# Patient Record
Sex: FEMALE | Race: WHITE | NOT HISPANIC OR LATINO | Employment: FULL TIME | URBAN - METROPOLITAN AREA
[De-identification: names, ages, dates, MRNs, and addresses within clinical notes are randomized per-mention and may not be internally consistent; named-entity substitution may affect disease eponyms.]

---

## 2017-12-26 ENCOUNTER — OFFICE VISIT (OUTPATIENT)
Dept: URGENT CARE | Facility: CLINIC | Age: 52
End: 2017-12-26
Payer: COMMERCIAL

## 2017-12-26 VITALS
BODY MASS INDEX: 27.31 KG/M2 | SYSTOLIC BLOOD PRESSURE: 147 MMHG | HEIGHT: 64 IN | DIASTOLIC BLOOD PRESSURE: 86 MMHG | RESPIRATION RATE: 18 BRPM | TEMPERATURE: 101 F | WEIGHT: 160 LBS | HEART RATE: 114 BPM | OXYGEN SATURATION: 97 %

## 2017-12-26 DIAGNOSIS — J10.1 INFLUENZA A: Primary | ICD-10-CM

## 2017-12-26 LAB
CTP QC/QA: YES
FLUAV AG NPH QL: POSITIVE
FLUBV AG NPH QL: NEGATIVE

## 2017-12-26 PROCEDURE — 87804 INFLUENZA ASSAY W/OPTIC: CPT | Mod: QW,S$GLB,, | Performed by: EMERGENCY MEDICINE

## 2017-12-26 PROCEDURE — 99214 OFFICE O/P EST MOD 30 MIN: CPT | Mod: 25,S$GLB,, | Performed by: EMERGENCY MEDICINE

## 2017-12-26 RX ORDER — PROMETHAZINE HYDROCHLORIDE AND CODEINE PHOSPHATE 6.25; 1 MG/5ML; MG/5ML
5 SOLUTION ORAL EVERY 4 HOURS PRN
Qty: 120 ML | Refills: 0 | Status: SHIPPED | OUTPATIENT
Start: 2017-12-26 | End: 2018-01-05

## 2017-12-26 RX ORDER — PREDNISONE 20 MG/1
20 TABLET ORAL DAILY
Qty: 3 TABLET | Refills: 0 | Status: SHIPPED | OUTPATIENT
Start: 2017-12-26 | End: 2017-12-29

## 2017-12-26 RX ORDER — OSELTAMIVIR PHOSPHATE 75 MG/1
75 CAPSULE ORAL 2 TIMES DAILY
Qty: 10 CAPSULE | Refills: 0 | Status: SHIPPED | OUTPATIENT
Start: 2017-12-26 | End: 2017-12-31

## 2017-12-26 NOTE — LETTER
December 26, 2017      [facility] [address] [place]  [facility] [address]  Phone: [phone]  Fax: [phone]       Patient: Madhavi Issa   YOB: 1965  Date of Visit: 12/26/2017    To Whom It May Concern:    Murali Issa  was at [facility] on 12/26/2017. She may return to work/school on 12/29/2017 with no restrictions. If you have any questions or concerns, or if I can be of further assistance, please do not hesitate to contact me.    Sincerely,      Navid Davies III, MD

## 2017-12-27 NOTE — PATIENT INSTRUCTIONS
Please drink plenty of fluids.  Please get plenty of rest.  Please return here or go to the Emergency Department for any concerns or worsening of condition.  If you were prescribed antibiotics, please take them to completion.  If you were given wait & see antibiotics, please wait 5-7 days before taking them, and only take them if your symptoms have worsened or not improved.  If you do begin taking the antibiotics, please take them to completion.  If you were prescribed a narcotic medication, do not drive or operate heavy equipment or machinery while taking these medications.  If you were given a steroid shot in the clinic and have also been given a prescription for a steroid such as Prednisone or a Medrol Dose Pack, please begin taking them tomorrow.  If you do not have Hypertension or any history of palpitations, it is ok to take over the counter Sudafed or Mucinex D or Allegra-D or Claritin-D or Zyrtec-D.  If you do take one of the above, it is ok to combine that with plain over the counter Mucinex or Allegra or Claritin or Zyrtec.  If for example you are taking Zyrtec -D, you can combine that with Mucinex, but not Mucinex-D.  If you are taking Mucinex-D, you can combine that with plain Allegra or Claritin or Zyrtec.   If you do have Hypertension or palpitations, it is safe to take Coricidin HBP for relief of sinus symptoms.  If not allergic, please take over the counter Tylenol (Acetaminophen) and/or Motrin (Ibuprofen) as directed for control of pain and/or fever.  Please follow up with your primary care doctor or specialist as needed.    If you  smoke, please stop smoking.      Influenza (Adult)    Influenza is also called the flu. It is a viral illness that affects the air passages of your lungs. It is different from the common cold. The flu can easily be passed from one to person to another. It may be spread through the air by coughing and sneezing. Or it can be spread by touching the sick person and then  touching your own eyes, nose, or mouth.  The flu starts 1 to 3 days after you are exposed to the flu virus. It may last for 1 to 2 weeks but many people feel tired or fatigued for many weeks afterward. You usually dont need to take antibiotics unless you have a complication. This might be an ear or sinus infection or pneumonia.  Symptoms of the flu may be mild or severe. They can include extreme tiredness (wanting to stay in bed all day), chills, fevers, muscle aches, soreness with eye movement, headache, and a dry, hacking cough.  Home care  Follow these guidelines when caring for yourself at home:  · Avoid being around cigarette smoke, whether yours or other peoples.  · Acetaminophen or ibuprofen will help ease your fever, muscle aches, and headache. Dont give aspirin to anyone younger than 18 who has the flu. Aspirin can harm the liver.  · Nausea and loss of appetite are common with the flu. Eat light meals. Drink 6 to 8 glasses of liquids every day. Good choices are water, sport drinks, soft drinks without caffeine, juices, tea, and soup. Extra fluids will also help loosen secretions in your nose and lungs.  · Over-the-counter cold medicines will not make the flu go away faster. But the medicines may help with coughing, sore throat, and congestion in your nose and sinuses. Dont use a decongestant if you have high blood pressure.  · Stay home until your fever has been gone for at least 24 hours without using medicine to reduce fever.  Follow-up care  Follow up with your healthcare provider, or as advised, if you are not getting better over the next week.  If you are age 65 or older, talk with your provider about getting a pneumococcal vaccine every 5 years. You should also get this vaccine if you have chronic asthma or COPD. All adults should get a flu vaccine every fall. Ask your provider about this.  When to seek medical advice  Call your healthcare provider right away if any of these occur:  · Cough with  lots of colored mucus (sputum) or blood in your mucus  · Chest pain, shortness of breath, wheezing, or trouble breathing  · Severe headache, or face, neck, or ear pain  · New rash with fever  · Fever of 100.4°F (38°C) or higher, or as directed by your healthcare provider  · Confusion, behavior change, or seizure  · Severe weakness or dizziness  · You get a new fever or cough after getting better for a few days  Date Last Reviewed: 1/1/2017  © 4460-4334 [employer]. [address] [place], [place] [id number]. All rights reserved. This information is not intended as a substitute for professional medical care. Always follow your healthcare professional's instructions.

## 2017-12-27 NOTE — PROGRESS NOTES
Subjective:       Patient ID: Madhavi Issa is a 52 y.o. female.    Vitals:    12/26/17 1916   BP: (!) 147/86   Pulse: (!) 114   Resp: 18   Temp: (!) 101.1 °F (38.4 °C)       Chief Complaint: Cough    Pt reports that she woke up yesterday morning with chills and body aches. Pt also complaints of congestion and cough. Pt has been taking Advil and Claritin d for symptoms.       Cough   This is a new problem. The current episode started yesterday. The problem has been unchanged. The cough is productive of sputum. Associated symptoms include chills, myalgias and a sore throat. Pertinent negatives include no chest pain, ear pain, eye redness, fever, headaches, shortness of breath or wheezing. Nothing aggravates the symptoms. She has tried nothing for the symptoms. The treatment provided no relief.     Review of Systems   Constitution: Positive for chills and malaise/fatigue. Negative for fever.   HENT: Positive for congestion and sore throat. Negative for ear pain and hoarse voice.    Eyes: Negative for discharge and redness.   Cardiovascular: Negative for chest pain, dyspnea on exertion and leg swelling.   Respiratory: Positive for cough and sputum production. Negative for shortness of breath and wheezing.    Musculoskeletal: Positive for myalgias.   Gastrointestinal: Negative for abdominal pain and nausea.   Neurological: Negative for headaches.       Objective:      Physical Exam   Constitutional: She is oriented to person, place, and time. She appears well-developed and well-nourished. She is cooperative.  Non-toxic appearance. She does not appear ill. No distress.   Non productive cough present through out the exam     HENT:   Head: Normocephalic and atraumatic.   Right Ear: Hearing, external ear and ear canal normal. A middle ear effusion is present.   Left Ear: Hearing, external ear and ear canal normal. A middle ear effusion is present.   Nose: Rhinorrhea present. No mucosal edema or nasal deformity. No  epistaxis. Right sinus exhibits no maxillary sinus tenderness and no frontal sinus tenderness. Left sinus exhibits no maxillary sinus tenderness and no frontal sinus tenderness.   Mouth/Throat: Uvula is midline, oropharynx is clear and moist and mucous membranes are normal. No trismus in the jaw. Normal dentition. No uvula swelling. No posterior oropharyngeal erythema.   Eyes: Conjunctivae and lids are normal. No scleral icterus.   Sclera clear bilat   Neck: Trachea normal, full passive range of motion without pain and phonation normal. Neck supple.   Cardiovascular: Normal rate, regular rhythm, normal heart sounds, intact distal pulses and normal pulses.    Pulmonary/Chest: Effort normal and breath sounds normal. No respiratory distress.   Abdominal: Soft. Normal appearance and bowel sounds are normal. She exhibits no distension. There is no tenderness.   Musculoskeletal: Normal range of motion. She exhibits no edema or deformity.   Neurological: She is alert and oriented to person, place, and time. She exhibits normal muscle tone. Coordination normal.   Skin: Skin is warm, dry and intact. She is not diaphoretic. No pallor.   Psychiatric: She has a normal mood and affect. Her speech is normal and behavior is normal. Judgment and thought content normal. Cognition and memory are normal.   Nursing note and vitals reviewed.      Assessment:       1. Influenza A        Plan:       Madhavi was seen today for cough.    Diagnoses and all orders for this visit:    Influenza A  -     POCT Influenza A/B  -     oseltamivir (TAMIFLU) 75 MG capsule; Take 1 capsule (75 mg total) by mouth 2 (two) times daily.  -     predniSONE (DELTASONE) 20 MG tablet; Take 1 tablet (20 mg total) by mouth once daily.  -     promethazine-codeine 6.25-10 mg/5 ml (PHENERGAN WITH CODEINE) 6.25-10 mg/5 mL syrup; Take 5 mLs by mouth every 4 (four) hours as needed for Cough.

## 2018-07-17 ENCOUNTER — PATIENT OUTREACH (OUTPATIENT)
Dept: ADMINISTRATIVE | Facility: HOSPITAL | Age: 53
End: 2018-07-17

## 2018-07-17 NOTE — PROGRESS NOTES
Patient due for annual PCP appointment.  Attempted to contact pt, no answer and unable to leave voice message.